# Patient Record
Sex: MALE | Race: WHITE | NOT HISPANIC OR LATINO | Employment: OTHER | URBAN - METROPOLITAN AREA
[De-identification: names, ages, dates, MRNs, and addresses within clinical notes are randomized per-mention and may not be internally consistent; named-entity substitution may affect disease eponyms.]

---

## 2023-05-26 LAB — SARS-COV-2 RESULT: NOT DETECTED

## 2023-10-06 PROBLEM — B35.1 ONYCHOMYCOSIS: Status: ACTIVE | Noted: 2023-09-19

## 2023-10-06 RX ORDER — TAMSULOSIN HYDROCHLORIDE 0.4 MG/1
0.4 CAPSULE ORAL
COMMUNITY
Start: 2023-07-24

## 2023-10-09 ENCOUNTER — OFFICE VISIT (OUTPATIENT)
Dept: CARDIOLOGY | Facility: CLINIC | Age: 78
End: 2023-10-09
Payer: MEDICARE

## 2023-10-09 ENCOUNTER — APPOINTMENT (OUTPATIENT)
Dept: CARDIOLOGY | Facility: CLINIC | Age: 78
End: 2023-10-09
Payer: MEDICARE

## 2023-10-09 VITALS
SYSTOLIC BLOOD PRESSURE: 138 MMHG | HEART RATE: 54 BPM | WEIGHT: 187 LBS | OXYGEN SATURATION: 100 % | DIASTOLIC BLOOD PRESSURE: 80 MMHG

## 2023-10-09 DIAGNOSIS — R60.0 EDEMA LEG: ICD-10-CM

## 2023-10-09 DIAGNOSIS — I50.9 CARDIAC EDEMA (MULTI): ICD-10-CM

## 2023-10-09 DIAGNOSIS — C61 PROSTATE CA (MULTI): ICD-10-CM

## 2023-10-09 DIAGNOSIS — R00.1 SINUS BRADYCARDIA: Primary | ICD-10-CM

## 2023-10-09 DIAGNOSIS — M79.89 SWELLING OF LOWER EXTREMITY: ICD-10-CM

## 2023-10-09 PROCEDURE — 1159F MED LIST DOCD IN RCRD: CPT | Performed by: INTERNAL MEDICINE

## 2023-10-09 PROCEDURE — 1160F RVW MEDS BY RX/DR IN RCRD: CPT | Performed by: INTERNAL MEDICINE

## 2023-10-09 PROCEDURE — 99204 OFFICE O/P NEW MOD 45 MIN: CPT | Performed by: INTERNAL MEDICINE

## 2023-10-09 PROCEDURE — 93000 ELECTROCARDIOGRAM COMPLETE: CPT | Performed by: INTERNAL MEDICINE

## 2023-10-09 RX ORDER — FUROSEMIDE 20 MG/1
20 TABLET ORAL DAILY
Qty: 90 TABLET | Refills: 3 | Status: SHIPPED | OUTPATIENT
Start: 2023-10-09 | End: 2023-11-20 | Stop reason: SDUPTHER

## 2023-10-09 NOTE — PROGRESS NOTES
Referred by Dr. Norwood for Leg Swelling     History Of Present Illness:    Jorje Espinal is a 78 y.o. male  with no cardiac hz presenting with LE edema several weeks.    Patient denies chest pain/SOB/palpitations/dizziness/lightheadedness//claudication     Active-works 40 hours per week at [employer]                  Past Medical History  Prostate cancer    Past Surgical History:  Prostate biopsy      Social History:  No tobacco    Family History:  Non contributory     Allergies:  Patient has no known allergies.    Outpatient Medications:  Current Outpatient Medications   Medication Instructions    tamsulosin (FLOMAX) 0.4 mg, oral, Daily RT        Last Recorded Vitals:  Vitals:    10/09/23 1537   BP: 134/78   BP Location: Left arm   Patient Position: Sitting   Pulse: 54   SpO2: 100%   Weight: 84.8 kg (187 lb)       Physical Exam:   PHYSICAL EXAM:    Constitutional/General:  Alert and oriented x3, well appearing, nontoxic, and in NAD  Neck:  No increased JVP,no carotid bruits.  Respiratory:  Lungs clear to auscultation bilaterally, no wheezes, rales, or rhonchi, not in respiratory distress  Cardiovascular:  Regular rate, regular rhythm, no murmurs, gallops, or rubs, PMI nondisplaced, 2+ distal pulses  Chest:  No chest wall tenderness  GI:  Abdomen soft, nontender, nondistended, + BS, no organomegaly, no palpable masses, no rebound, guarding, or rigidity  Musculoskeletal:  Moves all extremities x4  Extremities:3 plus pitting LE peripheral edema  Integument: Skin warm and dry, no rashes  Neurologic:  No focal deficits  Psychiatric:  Normal affect    Vital Signs, Nursing Notes, Allergies, and Medications reviewed by me.       Last Labs:  CBC -  Lab Results   Component Value Date    WBC 9.8 08/13/2023    HGB 13.0 (L) 08/13/2023    HCT 41.4 08/13/2023    MCV 93 08/13/2023     08/13/2023       CMP -  Lab Results   Component Value Date    CALCIUM 10.8 (H) 08/13/2023    PROT 7.5 08/13/2023    ALBUMIN 4.8 08/13/2023  "   AST 14 08/13/2023    ALT 8 (L) 08/13/2023    ALKPHOS 86 08/13/2023    BILITOT 0.9 08/13/2023       LIPID PANEL -   No results found for: \"CHOL\", \"TRIG\", \"HDL\", \"CHHDL\", \"LDLF\", \"VLDL\", \"NHDL\"    RENAL FUNCTION PANEL -   Lab Results   Component Value Date    GLUCOSE 117 (H) 08/13/2023     08/13/2023    K 4.2 08/13/2023     08/13/2023    CO2 24 08/13/2023    ANIONGAP 14 08/13/2023    BUN 16 08/13/2023    CREATININE 0.82 08/13/2023    GFRMALE 89 08/13/2023    CALCIUM 10.8 (H) 08/13/2023    ALBUMIN 4.8 08/13/2023        Lab Results   Component Value Date    BNP 98 12/27/2020       Last Cardiology Tests:  ECG:  ECG 12 Lead 10/9/2023    SBRAD  Echo:  No results found for this or any previous visit from the past 1095 days.  Ejection Fractions:  No results found for: \"EF\"  Cath:  No results found for this or any previous visit from the past 1095 days.    Stress Test:  No results found for this or any previous visit from the past 1095 days.    Cardiac Imaging:  No results found for this or any previous visit from the past 1095 days.      Lab review: I have personally reviewed the laboratory result(s)     Assessment/Plan   Problem List Items Addressed This Visit             ICD-10-CM    Swelling of lower extremity M79.89     Marked LE edema  No other signs or symptoms of CHF  Start diuretic  Check echo/BNP         Prostate CA (CMS/[facility]) C61     Other Visit Diagnoses         Codes    Sinus bradycardia    -  Primary R00.1    Relevant Orders    ECG 12 Lead (Completed)            Echo  BNP  Start Ginna Norwood, DO  "

## 2023-10-25 ENCOUNTER — HOSPITAL ENCOUNTER (OUTPATIENT)
Dept: CARDIOLOGY | Facility: CLINIC | Age: 78
Discharge: HOME | End: 2023-10-25
Payer: MEDICARE

## 2023-10-25 VITALS
BODY MASS INDEX: 30.05 KG/M2 | WEIGHT: 187 LBS | SYSTOLIC BLOOD PRESSURE: 138 MMHG | DIASTOLIC BLOOD PRESSURE: 80 MMHG | HEIGHT: 66 IN

## 2023-10-25 DIAGNOSIS — M79.89 SWELLING OF LOWER EXTREMITY: ICD-10-CM

## 2023-10-25 DIAGNOSIS — R60.0 EDEMA LEG: ICD-10-CM

## 2023-10-25 LAB — EJECTION FRACTION APICAL 4 CHAMBER: 71.3

## 2023-10-25 PROCEDURE — 93306 TTE W/DOPPLER COMPLETE: CPT | Performed by: INTERNAL MEDICINE

## 2023-10-25 PROCEDURE — 93306 TTE W/DOPPLER COMPLETE: CPT

## 2023-11-14 ENCOUNTER — LAB (OUTPATIENT)
Dept: LAB | Facility: LAB | Age: 78
End: 2023-11-14
Payer: MEDICARE

## 2023-11-14 DIAGNOSIS — M79.89 SWELLING OF LOWER EXTREMITY: ICD-10-CM

## 2023-11-14 DIAGNOSIS — I50.9 CARDIAC EDEMA (MULTI): ICD-10-CM

## 2023-11-14 LAB
ANION GAP SERPL CALC-SCNC: 11 MMOL/L (ref 10–20)
BNP SERPL-MCNC: 241 PG/ML (ref 0–99)
BUN SERPL-MCNC: 19 MG/DL (ref 6–23)
CALCIUM SERPL-MCNC: 10.3 MG/DL (ref 8.6–10.3)
CHLORIDE SERPL-SCNC: 108 MMOL/L (ref 98–107)
CO2 SERPL-SCNC: 26 MMOL/L (ref 21–32)
CREAT SERPL-MCNC: 0.95 MG/DL (ref 0.5–1.3)
GFR SERPL CREATININE-BSD FRML MDRD: 81 ML/MIN/1.73M*2
GLUCOSE SERPL-MCNC: 83 MG/DL (ref 74–99)
POTASSIUM SERPL-SCNC: 4.4 MMOL/L (ref 3.5–5.3)
SODIUM SERPL-SCNC: 141 MMOL/L (ref 136–145)

## 2023-11-14 PROCEDURE — 80048 BASIC METABOLIC PNL TOTAL CA: CPT

## 2023-11-14 PROCEDURE — 36415 COLL VENOUS BLD VENIPUNCTURE: CPT

## 2023-11-14 PROCEDURE — 83880 ASSAY OF NATRIURETIC PEPTIDE: CPT

## 2023-11-20 ENCOUNTER — OFFICE VISIT (OUTPATIENT)
Dept: CARDIOLOGY | Facility: CLINIC | Age: 78
End: 2023-11-20
Payer: MEDICARE

## 2023-11-20 VITALS
HEART RATE: 55 BPM | HEIGHT: 70 IN | OXYGEN SATURATION: 95 % | DIASTOLIC BLOOD PRESSURE: 88 MMHG | BODY MASS INDEX: 26.92 KG/M2 | WEIGHT: 188 LBS | SYSTOLIC BLOOD PRESSURE: 160 MMHG

## 2023-11-20 DIAGNOSIS — M79.89 SWELLING OF LOWER EXTREMITY: ICD-10-CM

## 2023-11-20 DIAGNOSIS — I10 HYPERTENSION, UNSPECIFIED TYPE: Primary | ICD-10-CM

## 2023-11-20 PROCEDURE — 3079F DIAST BP 80-89 MM HG: CPT | Performed by: INTERNAL MEDICINE

## 2023-11-20 PROCEDURE — 1160F RVW MEDS BY RX/DR IN RCRD: CPT | Performed by: INTERNAL MEDICINE

## 2023-11-20 PROCEDURE — 99214 OFFICE O/P EST MOD 30 MIN: CPT | Performed by: INTERNAL MEDICINE

## 2023-11-20 PROCEDURE — 1036F TOBACCO NON-USER: CPT | Performed by: INTERNAL MEDICINE

## 2023-11-20 PROCEDURE — 1159F MED LIST DOCD IN RCRD: CPT | Performed by: INTERNAL MEDICINE

## 2023-11-20 PROCEDURE — 3077F SYST BP >= 140 MM HG: CPT | Performed by: INTERNAL MEDICINE

## 2023-11-20 RX ORDER — FUROSEMIDE 40 MG/1
40 TABLET ORAL DAILY
Qty: 90 TABLET | Refills: 3 | Status: SHIPPED | OUTPATIENT
Start: 2023-11-20 | End: 2024-04-25

## 2023-11-20 NOTE — PROGRESS NOTES
"Chief Complaint:   Edema     History Of Present Illness:    Jorje Espinal is a 78 y.o. male presenting with edema.  Edema is better  Patient denies chest pain/SOB/palpitations/dizziness/lightheadedness/claudication    No regular exerciser       Last Recorded Vitals:  Vitals:    11/20/23 1507 11/20/23 1538   BP: 148/90 160/88   BP Location: Left arm    Patient Position: Sitting    BP Cuff Size: Adult    Pulse: 55    SpO2: 95%    Weight: 85.3 kg (188 lb)    Height: 1.778 m (5' 10\")             Allergies:  Patient has no known allergies.    Outpatient Medications:  Current Outpatient Medications   Medication Instructions    enzalutamide (XTANDI) 160 mg, oral, Daily, Take with or without food at the same time each day. Do not crush, break, or dissolve. Swallow it whole.    furosemide (LASIX) 20 mg, oral, Daily    tamsulosin (FLOMAX) 0.4 mg, oral, Daily RT       Physical Exam:  Constitutional:       Appearance: Not in distress. Frail.   Neck:      Vascular: No JVR. JVD normal.   Pulmonary:      Effort: Pulmonary effort is normal.      Breath sounds: Normal breath sounds. No wheezing. No rhonchi. No rales.   Chest:      Chest wall: Not tender to palpatation.   Cardiovascular:      PMI at left midclavicular line. Normal rate. Regular rhythm. Normal S1. Normal S2.       Murmurs: There is no murmur.      No gallop.  No click. No rub.   Pulses:     Intact distal pulses.   Edema:     Pretibial: bilateral 1+ pitting edema of the pretibial area.     Ankle: bilateral 1+ pitting edema of the ankle.     Feet: bilateral 1+ pitting edema of the feet.  Abdominal:      General: Bowel sounds are normal.      Palpations: Abdomen is soft.      Tenderness: There is no abdominal tenderness.   Musculoskeletal: Normal range of motion.         General: No tenderness. Skin:     General: Skin is warm and dry.   Neurological:      General: No focal deficit present.      Mental Status: Alert and oriented to person, place and time.          Last " "Labs:  CBC -  Lab Results   Component Value Date    WBC 9.8 08/13/2023    HGB 13.0 (L) 08/13/2023    HCT 41.4 08/13/2023    MCV 93 08/13/2023     08/13/2023       CMP -  Lab Results   Component Value Date    CALCIUM 10.3 11/14/2023    PROT 7.5 08/13/2023    ALBUMIN 4.8 08/13/2023    AST 14 08/13/2023    ALT 8 (L) 08/13/2023    ALKPHOS 86 08/13/2023    BILITOT 0.9 08/13/2023       LIPID PANEL -   No results found for: \"CHOL\", \"TRIG\", \"HDL\", \"CHHDL\", \"LDLF\", \"VLDL\", \"NHDL\"    RENAL FUNCTION PANEL -   Lab Results   Component Value Date    GLUCOSE 83 11/14/2023     11/14/2023    K 4.4 11/14/2023     (H) 11/14/2023    CO2 26 11/14/2023    ANIONGAP 11 11/14/2023    BUN 19 11/14/2023    CREATININE 0.95 11/14/2023    GFRMALE 89 08/13/2023    CALCIUM 10.3 11/14/2023    ALBUMIN 4.8 08/13/2023        Lab Results   Component Value Date     (H) 11/14/2023           Lab review: I have personally reviewed the laboratory result(s)       Problem List Items Addressed This Visit       Swelling of lower extremity    Overview     L edema better with Lasix but still not resolved thus increase from 20-40 MG  Note 11/2023 BMP OK  Suspect more peripheral than central however BNP 11/2023 was mildly elevated at 241.NL LVEF per echo.  Increasing Lasix           Hypertension - Primary    Overview     BP elevated in office and also edema thus increase Lasix          Watch salt intake    Increase furosemide(water pill) to 40 mg daily both for swelling and BP      Harshad Norwood, DO  "

## 2024-02-20 ENCOUNTER — OFFICE VISIT (OUTPATIENT)
Dept: CARDIOLOGY | Facility: CLINIC | Age: 79
End: 2024-02-20
Payer: MEDICARE

## 2024-02-20 VITALS
SYSTOLIC BLOOD PRESSURE: 138 MMHG | BODY MASS INDEX: 27.09 KG/M2 | HEART RATE: 74 BPM | DIASTOLIC BLOOD PRESSURE: 76 MMHG | OXYGEN SATURATION: 94 % | WEIGHT: 188.8 LBS

## 2024-02-20 DIAGNOSIS — I10 HYPERTENSION, UNSPECIFIED TYPE: ICD-10-CM

## 2024-02-20 DIAGNOSIS — M79.89 SWELLING OF LOWER EXTREMITY: Primary | ICD-10-CM

## 2024-02-20 PROCEDURE — 99213 OFFICE O/P EST LOW 20 MIN: CPT | Performed by: INTERNAL MEDICINE

## 2024-02-20 PROCEDURE — 3078F DIAST BP <80 MM HG: CPT | Performed by: INTERNAL MEDICINE

## 2024-02-20 PROCEDURE — 1159F MED LIST DOCD IN RCRD: CPT | Performed by: INTERNAL MEDICINE

## 2024-02-20 PROCEDURE — 1160F RVW MEDS BY RX/DR IN RCRD: CPT | Performed by: INTERNAL MEDICINE

## 2024-02-20 PROCEDURE — 1036F TOBACCO NON-USER: CPT | Performed by: INTERNAL MEDICINE

## 2024-02-20 PROCEDURE — 3075F SYST BP GE 130 - 139MM HG: CPT | Performed by: INTERNAL MEDICINE

## 2024-02-20 NOTE — PROGRESS NOTES
Chief Complaint:   Edema     History Of Present Illness:    Jorje Espinal is a 78 y.o. male presenting with CV dz  Edema is better  Patient denies chest pain/SOB/palpitations/dizziness/lightheadedness/claudication  More active-shoveled driveway         Last Recorded Vitals:  Vitals:    02/20/24 1522   BP: 138/76   BP Location: Left arm   Patient Position: Sitting   BP Cuff Size: Adult   Pulse: 74   SpO2: 94%   Weight: 85.6 kg (188 lb 12.8 oz)            Allergies:  Patient has no known allergies.    Outpatient Medications:  Current Outpatient Medications   Medication Instructions    enzalutamide (XTANDI) 160 mg, oral, Daily, Take with or without food at the same time each day. Do not crush, break, or dissolve. Swallow it whole.    furosemide (LASIX) 40 mg, oral, Daily    tamsulosin (FLOMAX) 0.4 mg, oral, Daily RT       Physical Exam:  Constitutional:       Appearance: Not in distress. Frail.   Neck:      Vascular: No JVR. JVD normal.   Pulmonary:      Effort: Pulmonary effort is normal.      Breath sounds: Normal breath sounds. No wheezing. No rhonchi. No rales.   Chest:      Chest wall: Not tender to palpatation.   Cardiovascular:      PMI at left midclavicular line. Normal rate. Regular rhythm. Normal S1. Normal S2.       Murmurs: There is no murmur.      No gallop.  No click. No rub.   Pulses:     Intact distal pulses.   Edema:     Peripheral edema absent.   Abdominal:      General: Bowel sounds are normal.      Palpations: Abdomen is soft.      Tenderness: There is no abdominal tenderness.   Musculoskeletal: Normal range of motion.         General: No tenderness. Skin:     General: Skin is warm and dry.   Neurological:      General: No focal deficit present.      Mental Status: Alert and oriented to person, place and time.          Last Labs:  CBC -  Lab Results   Component Value Date    WBC 9.8 08/13/2023    HGB 13.0 (L) 08/13/2023    HCT 41.4 08/13/2023    MCV 93 08/13/2023     08/13/2023       CMP  "-  Lab Results   Component Value Date    CALCIUM 10.3 11/14/2023    PROT 7.5 08/13/2023    ALBUMIN 4.8 08/13/2023    AST 14 08/13/2023    ALT 8 (L) 08/13/2023    ALKPHOS 86 08/13/2023    BILITOT 0.9 08/13/2023       LIPID PANEL -   No results found for: \"CHOL\", \"TRIG\", \"HDL\", \"CHHDL\", \"LDLF\", \"VLDL\", \"NHDL\"    RENAL FUNCTION PANEL -   Lab Results   Component Value Date    GLUCOSE 83 11/14/2023     11/14/2023    K 4.4 11/14/2023     (H) 11/14/2023    CO2 26 11/14/2023    ANIONGAP 11 11/14/2023    BUN 19 11/14/2023    CREATININE 0.95 11/14/2023    GFRMALE 89 08/13/2023    CALCIUM 10.3 11/14/2023    ALBUMIN 4.8 08/13/2023        Lab Results   Component Value Date     (H) 11/14/2023           Lab review: I have personally reviewed the laboratory result(s)       Problem List Items Addressed This Visit       Swelling of lower extremity - Primary    Overview     Lower  edema better with Lasix but still not resolved thus increased from 20-40 MG  Note 11/2023 BMP OK  Edema now largely resolved  Note:Suspect more peripheral than central however BNP 11/2023 was mildly elevated at 241.NL LVEF per echo.  Follow           Hypertension    Overview     BP better after increasing Lasix          Watch salt intake    Stay active      Harshad Norwood,   "

## 2024-04-23 DIAGNOSIS — M79.89 SWELLING OF LOWER EXTREMITY: ICD-10-CM

## 2024-04-23 NOTE — TELEPHONE ENCOUNTER
Pt is almost out of the 40mg Lasix and wants to know if he should continue taking 40 or go back down to 20. He hasn't been having any leg edema. Does he need lab work? Depending on what dosage pt needs a refill sent to  in [place]. 90 day supply.

## 2024-04-24 RX ORDER — FUROSEMIDE 40 MG/1
40 TABLET ORAL DAILY
Qty: 90 TABLET | Refills: 3 | Status: CANCELLED | OUTPATIENT
Start: 2024-04-24 | End: 2025-04-24

## 2024-04-25 RX ORDER — FUROSEMIDE 20 MG/1
20 TABLET ORAL DAILY
Qty: 90 TABLET | Refills: 3 | Status: SHIPPED | OUTPATIENT
Start: 2024-04-25 | End: 2025-04-25

## 2024-07-30 PROBLEM — M79.675 PAIN IN LEFT TOE(S): Status: ACTIVE | Noted: 2023-12-18

## 2024-07-30 PROBLEM — M79.674 PAIN IN RIGHT TOE(S): Status: ACTIVE | Noted: 2023-12-18

## 2024-08-20 ENCOUNTER — OFFICE VISIT (OUTPATIENT)
Dept: CARDIOLOGY | Facility: CLINIC | Age: 79
End: 2024-08-20
Payer: MEDICARE

## 2024-08-20 ENCOUNTER — APPOINTMENT (OUTPATIENT)
Dept: CARDIOLOGY | Facility: CLINIC | Age: 79
End: 2024-08-20
Payer: MEDICARE

## 2024-08-20 VITALS
WEIGHT: 179 LBS | OXYGEN SATURATION: 97 % | SYSTOLIC BLOOD PRESSURE: 130 MMHG | BODY MASS INDEX: 25.68 KG/M2 | HEART RATE: 61 BPM | DIASTOLIC BLOOD PRESSURE: 80 MMHG

## 2024-08-20 DIAGNOSIS — M79.89 SWELLING OF LOWER EXTREMITY: Primary | ICD-10-CM

## 2024-08-20 DIAGNOSIS — I10 HYPERTENSION, UNSPECIFIED TYPE: ICD-10-CM

## 2024-08-20 PROCEDURE — 99213 OFFICE O/P EST LOW 20 MIN: CPT | Performed by: INTERNAL MEDICINE

## 2024-08-20 PROCEDURE — 1160F RVW MEDS BY RX/DR IN RCRD: CPT | Performed by: INTERNAL MEDICINE

## 2024-08-20 PROCEDURE — 3075F SYST BP GE 130 - 139MM HG: CPT | Performed by: INTERNAL MEDICINE

## 2024-08-20 PROCEDURE — 1159F MED LIST DOCD IN RCRD: CPT | Performed by: INTERNAL MEDICINE

## 2024-08-20 PROCEDURE — 1036F TOBACCO NON-USER: CPT | Performed by: INTERNAL MEDICINE

## 2024-08-20 PROCEDURE — 3079F DIAST BP 80-89 MM HG: CPT | Performed by: INTERNAL MEDICINE

## 2024-08-20 NOTE — PROGRESS NOTES
Chief Complaint:   Edema     History Of Present Illness:    Jorje Espinal is a 78 y.o. male presenting with edema.  Edema is better  Patient denies chest pain/SOB/palpitations/dizziness/lightheadedness/claudication    No regular exercise but active around house /yard       Last Recorded Vitals:  Vitals:    08/20/24 1328 08/20/24 1357   BP: 158/86 130/80   BP Location: Left arm    Patient Position: Sitting    BP Cuff Size: Adult    Pulse: 61    SpO2: 97%    Weight: 81.2 kg (179 lb)             Allergies:  Patient has no known allergies.    Outpatient Medications:  Current Outpatient Medications   Medication Instructions    enzalutamide (XTANDI) 160 mg, oral, Daily, Take with or without food at the same time each day. Do not crush, break, or dissolve. Swallow it whole.    furosemide (LASIX) 20 mg, oral, Daily    tamsulosin (FLOMAX) 0.4 mg, oral, Daily RT       Physical Exam:  Constitutional:       Appearance: Not in distress. Frail.   Neck:      Vascular: No JVR. JVD normal.   Pulmonary:      Effort: Pulmonary effort is normal.      Breath sounds: Normal breath sounds. No wheezing. No rhonchi. No rales.   Chest:      Chest wall: Not tender to palpatation.   Cardiovascular:      PMI at left midclavicular line. Normal rate. Regular rhythm. Normal S1. Normal S2.       Murmurs: There is no murmur.      No gallop.  No click. No rub.   Pulses:     Intact distal pulses.   Edema:     Pretibial: bilateral 1+ pitting edema of the pretibial area.     Ankle: bilateral 1+ pitting edema of the ankle.     Feet: bilateral 1+ pitting edema of the feet.  Abdominal:      General: Bowel sounds are normal.      Palpations: Abdomen is soft.      Tenderness: There is no abdominal tenderness.   Musculoskeletal: Normal range of motion.         General: No tenderness. Skin:     General: Skin is warm and dry.   Neurological:      General: No focal deficit present.      Mental Status: Alert and oriented to person, place and time.          Last  "Labs:  CBC -  Lab Results   Component Value Date    WBC 9.8 08/13/2023    HGB 13.0 (L) 08/13/2023    HCT 41.4 08/13/2023    MCV 93 08/13/2023     08/13/2023       CMP -  Lab Results   Component Value Date    CALCIUM 10.3 11/14/2023    PROT 7.5 08/13/2023    ALBUMIN 4.8 08/13/2023    AST 14 08/13/2023    ALT 8 (L) 08/13/2023    ALKPHOS 86 08/13/2023    BILITOT 0.9 08/13/2023       LIPID PANEL -   No results found for: \"CHOL\", \"TRIG\", \"HDL\", \"CHHDL\", \"LDLF\", \"VLDL\", \"NHDL\"    RENAL FUNCTION PANEL -   Lab Results   Component Value Date    GLUCOSE 83 11/14/2023     11/14/2023    K 4.4 11/14/2023     (H) 11/14/2023    CO2 26 11/14/2023    ANIONGAP 11 11/14/2023    BUN 19 11/14/2023    CREATININE 0.95 11/14/2023    GFRMALE 89 08/13/2023    CALCIUM 10.3 11/14/2023    ALBUMIN 4.8 08/13/2023        Lab Results   Component Value Date     (H) 11/14/2023           Lab review: I have personally reviewed the laboratory result(s)       Problem List Items Addressed This Visit       Swelling of lower extremity - Primary    Overview     Edema better  Have suspected more peripheral than central  On Lasix  11/2024 BMP OK           Hypertension    Overview     BP stable  11/2023 BMP OK                Harshad Norwood, DO  "

## 2024-10-02 DIAGNOSIS — C61 PROSTATE CA (MULTI): ICD-10-CM

## 2024-10-02 DIAGNOSIS — Z12.5 PROSTATE CANCER SCREENING: Primary | ICD-10-CM

## 2024-10-20 NOTE — PROGRESS NOTES
Subjective   Patient ID: Jorje Espinal is a 79 y.o. male who presents for No chief complaint on file..  HPI  PT PRESENTS FOR A F/U ON HIS PROSTATE CANCER.  PT IS ON XTANDI 4 TABS/ DAY AND AN INJECTION  OF SOME TYPE-- LAST SHOT WAS 7-24-24  Are you experiencing:  Burning on urination -- NO  Pain on urination  -- NO  Urinary frequency -- YES -- PT IS ON LASIX  Urinary urgency -- RARE  Urge incontinence -- RARE  Urinary stress incontinence  -- NO  Number of pads used per day -- NONE  Eneuresis -- NO  Nocturia--3 X ON AVG  Hematuria -- NO  Hesitancy -- NO  Post void fullness --  NO    Review of Systems  General-- No C/O fever or chills  Head-- No C/O Dizziness  Eyes-- NO  C/O blurry or double vision  Ears-- No C/O hearing loss  Neck-- Supple  Chest-- No C/O pain or discomfort  Lungs-- No C/O shortness of breath  Abdomen-- No C/O  pain or discomfort, No nausea or vomiting  Back-- No C/O back pain or discomfort  Extremities-- No C/O swelling or pain    Objective   Physical Exam    General-- well developed, well nourished in NAD  Head-- normal cephalic, atraumatic  Eyes-- PERRL, EOM'S FROM,  no  jaundice  Neck-- Supple, without masses  Chest-- Normal bony structure  Abdomen-- soft, non tender, liver spleen not palpable . No supra pubic masses  Back-- no flank masses palpable, no CVA tenderness on palpation or perc;ussion  Lymph nodes-- No inguinal lymphadenopathy noted  Prostate-- 1+, firm, smooth, non tender,without nodules  Testis-- both down, non tender, without masses  Epididymis-- no masses palpable  Scrotum -- no hydrocele noted  Extremities -- Normal muscle mass and tone for the patients age -- ? PETECHIA ON THE LOWER EXTREMITIES AND LOWER ABD  Neurological-- oriented times three    URINALYSIS DIPSTICK-- TRACE OF HEMOGLOBINURIA    PSA  10-21-24-- < 0.10    8-24-24  PSMA PET SCAN:  Impression   1. Heterogeneous mild Ga68 PSMA uptake within the prostate gland  without definite area to correspond to patient's recently  diagnosed  prostate cancer.  2. No evidence of PSMA expressing pelvic peyton or distant metastasis.      Assessment/Plan   A:  H/O PROSTATE CANCER-- PT IS CURRENTLY ON TOTAL HORMONE ABLATION   PT IS NOT SURE WHAT TYPE OF INJECTION HE IS GETTING  NEG PSMA PET SCAN  ON 8-24-24 FOR METASTATIC DISEASE  P:  CONTINUE:  FLOMAX 0.4 MG / DAY  XTANDI 160 MG / DAY  OBTAIN ALL OLD RECORDS FROM [facility] UROLOGY TO SEE WHET TYPE OF INJECTION HE IS GETTING  F/U IN 1-2 WEEKS FOR A 6 MONTH LUPRON INJECTION-- GIVEN THE UNDETECTABLE PSA AND NEG PET SCAN WE MAY BE ABLE TO START HIM ON CYCLING THERAPY DEPENDING ON WHAT THE  PREVIOUS PROSTATE BX GÉNESIS SCORE IS   CBC AND PLATELET COUNT NOW  Max Hines MD 10/20/24 1:29 PM   ADDENDUM  CBC AND PLATELET COUNT   HEMATOCRIT -- 34.8  PLATELET COUNT -- 247,000

## 2024-10-21 ENCOUNTER — OFFICE VISIT (OUTPATIENT)
Dept: UROLOGY | Facility: CLINIC | Age: 79
End: 2024-10-21
Payer: MEDICARE

## 2024-10-21 ENCOUNTER — LAB (OUTPATIENT)
Dept: LAB | Facility: LAB | Age: 79
End: 2024-10-21
Payer: MEDICARE

## 2024-10-21 VITALS
HEIGHT: 70 IN | RESPIRATION RATE: 16 BRPM | DIASTOLIC BLOOD PRESSURE: 86 MMHG | WEIGHT: 179 LBS | SYSTOLIC BLOOD PRESSURE: 153 MMHG | BODY MASS INDEX: 25.62 KG/M2 | HEART RATE: 100 BPM | TEMPERATURE: 98.7 F

## 2024-10-21 DIAGNOSIS — N39.41 URGENCY INCONTINENCE: ICD-10-CM

## 2024-10-21 DIAGNOSIS — C61 PROSTATE CA (MULTI): ICD-10-CM

## 2024-10-21 DIAGNOSIS — R35.0 FREQUENCY OF MICTURITION: ICD-10-CM

## 2024-10-21 DIAGNOSIS — Z12.5 PROSTATE CANCER SCREENING: ICD-10-CM

## 2024-10-21 DIAGNOSIS — Z13.89 SCREENING FOR BLOOD OR PROTEIN IN URINE: ICD-10-CM

## 2024-10-21 DIAGNOSIS — N40.1 BPH WITH URINARY OBSTRUCTION: Primary | ICD-10-CM

## 2024-10-21 DIAGNOSIS — R39.15 URGENCY OF MICTURITION: ICD-10-CM

## 2024-10-21 DIAGNOSIS — N13.8 BPH WITH URINARY OBSTRUCTION: ICD-10-CM

## 2024-10-21 DIAGNOSIS — N40.1 BPH WITH URINARY OBSTRUCTION: ICD-10-CM

## 2024-10-21 DIAGNOSIS — N13.8 BPH WITH URINARY OBSTRUCTION: Primary | ICD-10-CM

## 2024-10-21 DIAGNOSIS — R35.1 NOCTURIA: ICD-10-CM

## 2024-10-21 DIAGNOSIS — C61 PROSTATE CANCER (MULTI): ICD-10-CM

## 2024-10-21 LAB
BASOPHILS # BLD AUTO: 0.02 X10*3/UL (ref 0–0.1)
BASOPHILS NFR BLD AUTO: 0.5 %
EOSINOPHIL # BLD AUTO: 0.11 X10*3/UL (ref 0–0.4)
EOSINOPHIL NFR BLD AUTO: 2.5 %
ERYTHROCYTE [DISTWIDTH] IN BLOOD BY AUTOMATED COUNT: 12.9 % (ref 11.5–14.5)
HCT VFR BLD AUTO: 34.8 % (ref 41–52)
HGB BLD-MCNC: 10.9 G/DL (ref 13.5–17.5)
IMM GRANULOCYTES # BLD AUTO: 0.01 X10*3/UL (ref 0–0.5)
IMM GRANULOCYTES NFR BLD AUTO: 0.2 % (ref 0–0.9)
LYMPHOCYTES # BLD AUTO: 1.1 X10*3/UL (ref 0.8–3)
LYMPHOCYTES NFR BLD AUTO: 25 %
MCH RBC QN AUTO: 29.5 PG (ref 26–34)
MCHC RBC AUTO-ENTMCNC: 31.3 G/DL (ref 32–36)
MCV RBC AUTO: 94 FL (ref 80–100)
MONOCYTES # BLD AUTO: 0.32 X10*3/UL (ref 0.05–0.8)
MONOCYTES NFR BLD AUTO: 7.3 %
NEUTROPHILS # BLD AUTO: 2.84 X10*3/UL (ref 1.6–5.5)
NEUTROPHILS NFR BLD AUTO: 64.5 %
NRBC BLD-RTO: 0 /100 WBCS (ref 0–0)
PLATELET # BLD AUTO: 247 X10*3/UL (ref 150–450)
POC APPEARANCE, URINE: CLEAR
POC BILIRUBIN, URINE: NEGATIVE
POC BLOOD, URINE: ABNORMAL
POC COLOR, URINE: YELLOW
POC GLUCOSE, URINE: NEGATIVE MG/DL
POC KETONES, URINE: NEGATIVE MG/DL
POC LEUKOCYTES, URINE: NEGATIVE
POC NITRITE,URINE: NEGATIVE
POC PH, URINE: 5.5 PH
POC PROTEIN, URINE: NEGATIVE MG/DL
POC SPECIFIC GRAVITY, URINE: 1.02
POC UROBILINOGEN, URINE: 0.2 EU/DL
PSA SERPL-MCNC: <0.1 NG/ML
PSA SERPL-MCNC: <0.1 NG/ML
RBC # BLD AUTO: 3.7 X10*6/UL (ref 4.5–5.9)
WBC # BLD AUTO: 4.4 X10*3/UL (ref 4.4–11.3)

## 2024-10-21 PROCEDURE — 1036F TOBACCO NON-USER: CPT | Performed by: UROLOGY

## 2024-10-21 PROCEDURE — 3077F SYST BP >= 140 MM HG: CPT | Performed by: UROLOGY

## 2024-10-21 PROCEDURE — 3079F DIAST BP 80-89 MM HG: CPT | Performed by: UROLOGY

## 2024-10-21 PROCEDURE — 36415 COLL VENOUS BLD VENIPUNCTURE: CPT

## 2024-10-21 PROCEDURE — 81003 URINALYSIS AUTO W/O SCOPE: CPT | Mod: QW | Performed by: UROLOGY

## 2024-10-21 PROCEDURE — 1160F RVW MEDS BY RX/DR IN RCRD: CPT | Performed by: UROLOGY

## 2024-10-21 PROCEDURE — 84153 ASSAY OF PSA TOTAL: CPT

## 2024-10-21 PROCEDURE — 99214 OFFICE O/P EST MOD 30 MIN: CPT | Performed by: UROLOGY

## 2024-10-21 PROCEDURE — G0103 PSA SCREENING: HCPCS

## 2024-10-21 PROCEDURE — 1159F MED LIST DOCD IN RCRD: CPT | Performed by: UROLOGY

## 2024-10-21 PROCEDURE — 85025 COMPLETE CBC W/AUTO DIFF WBC: CPT

## 2024-10-21 PROCEDURE — 1126F AMNT PAIN NOTED NONE PRSNT: CPT | Performed by: UROLOGY

## 2024-10-21 RX ORDER — TAMSULOSIN HYDROCHLORIDE 0.4 MG/1
0.4 CAPSULE ORAL DAILY
Qty: 90 CAPSULE | Refills: 3 | Status: SHIPPED | OUTPATIENT
Start: 2024-10-21 | End: 2025-10-21

## 2024-10-21 SDOH — ECONOMIC STABILITY: FOOD INSECURITY: WITHIN THE PAST 12 MONTHS, YOU WORRIED THAT YOUR FOOD WOULD RUN OUT BEFORE YOU GOT MONEY TO BUY MORE.: NEVER TRUE

## 2024-10-21 SDOH — ECONOMIC STABILITY: FOOD INSECURITY: WITHIN THE PAST 12 MONTHS, THE FOOD YOU BOUGHT JUST DIDN'T LAST AND YOU DIDN'T HAVE MONEY TO GET MORE.: NEVER TRUE

## 2024-10-21 ASSESSMENT — COLUMBIA-SUICIDE SEVERITY RATING SCALE - C-SSRS
2. HAVE YOU ACTUALLY HAD ANY THOUGHTS OF KILLING YOURSELF?: NO
6. HAVE YOU EVER DONE ANYTHING, STARTED TO DO ANYTHING, OR PREPARED TO DO ANYTHING TO END YOUR LIFE?: NO
1. IN THE PAST MONTH, HAVE YOU WISHED YOU WERE DEAD OR WISHED YOU COULD GO TO SLEEP AND NOT WAKE UP?: NO

## 2024-10-21 ASSESSMENT — PATIENT HEALTH QUESTIONNAIRE - PHQ9
SUM OF ALL RESPONSES TO PHQ9 QUESTIONS 1 AND 2: 0
1. LITTLE INTEREST OR PLEASURE IN DOING THINGS: NOT AT ALL
2. FEELING DOWN, DEPRESSED OR HOPELESS: NOT AT ALL

## 2024-10-21 ASSESSMENT — ENCOUNTER SYMPTOMS
LOSS OF SENSATION IN FEET: 1
DEPRESSION: 0
OCCASIONAL FEELINGS OF UNSTEADINESS: 1

## 2024-10-21 ASSESSMENT — LIFESTYLE VARIABLES
SKIP TO QUESTIONS 9-10: 1
AUDIT-C TOTAL SCORE: 0
HOW OFTEN DO YOU HAVE A DRINK CONTAINING ALCOHOL: NEVER
HOW MANY STANDARD DRINKS CONTAINING ALCOHOL DO YOU HAVE ON A TYPICAL DAY: PATIENT DOES NOT DRINK
HOW OFTEN DO YOU HAVE SIX OR MORE DRINKS ON ONE OCCASION: NEVER

## 2024-10-21 ASSESSMENT — PAIN SCALES - GENERAL: PAINLEVEL_OUTOF10: 0-NO PAIN

## 2024-10-21 NOTE — LETTER
October 23, 2024     Jasvir Levy MD  [address]  [facility] [place], [address]  [facility] [address]    Patient: Jorje Espinal   YOB: 1945   Date of Visit: 10/21/2024       Dear Dr. Jasvir Levy MD:    Thank you for referring Jorje Espinal to me for evaluation. Below are my notes for this consultation.  If you have questions, please do not hesitate to call me. I look forward to following your patient along with you.       Sincerely,     Max Hines MD      CC: No Recipients  ______________________________________________________________________________________    Subjective  Patient ID: Jorje Espinal is a 79 y.o. male who presents for No chief complaint on file..  HPI  PT PRESENTS FOR A F/U ON HIS PROSTATE CANCER.  PT IS ON XTANDI 4 TABS/ DAY AND AN INJECTION  OF SOME TYPE-- LAST SHOT WAS 7-24-24  Are you experiencing:  Burning on urination -- NO  Pain on urination  -- NO  Urinary frequency -- YES -- PT IS ON LASIX  Urinary urgency -- RARE  Urge incontinence -- RARE  Urinary stress incontinence  -- NO  Number of pads used per day -- NONE  Eneuresis -- NO  Nocturia--3 X ON AVG  Hematuria -- NO  Hesitancy -- NO  Post void fullness --  NO    Review of Systems  General-- No C/O fever or chills  Head-- No C/O Dizziness  Eyes-- NO  C/O blurry or double vision  Ears-- No C/O hearing loss  Neck-- Supple  Chest-- No C/O pain or discomfort  Lungs-- No C/O shortness of breath  Abdomen-- No C/O  pain or discomfort, No nausea or vomiting  Back-- No C/O back pain or discomfort  Extremities-- No C/O swelling or pain    Objective   Physical Exam    General-- well developed, well nourished in NAD  Head-- normal cephalic, atraumatic  Eyes-- PERRL, EOM'S FROM,  no  jaundice  Neck-- Supple, without masses  Chest-- Normal bony structure  Abdomen-- soft, non tender, liver spleen not palpable . No supra pubic masses  Back-- no flank masses palpable, no CVA tenderness on palpation or perc;ussion  Lymph nodes-- No  inguinal lymphadenopathy noted  Prostate-- 1+, firm, smooth, non tender,without nodules  Testis-- both down, non tender, without masses  Epididymis-- no masses palpable  Scrotum -- no hydrocele noted  Extremities -- Normal muscle mass and tone for the patients age -- ? PETECHIA ON THE LOWER EXTREMITIES AND LOWER ABD  Neurological-- oriented times three    URINALYSIS DIPSTICK-- TRACE OF HEMOGLOBINURIA    PSA  10-21-24-- < 0.10    8-24-24  PSMA PET SCAN:  Impression   1. Heterogeneous mild Ga68 PSMA uptake within the prostate gland  without definite area to correspond to patient's recently diagnosed  prostate cancer.  2. No evidence of PSMA expressing pelvic peyton or distant metastasis.      Assessment/Plan   A:  H/O PROSTATE CANCER-- PT IS CURRENTLY ON TOTAL HORMONE ABLATION   PT IS NOT SURE WHAT TYPE OF INJECTION HE IS GETTING  NEG PSMA PET SCAN  ON 8-24-24 FOR METASTATIC DISEASE  P:  CONTINUE:  FLOMAX 0.4 MG / DAY  XTANDI 160 MG / DAY  OBTAIN ALL OLD RECORDS FROM [facility] UROLOGY TO SEE WHET TYPE OF INJECTION HE IS GETTING  F/U IN 1-2 WEEKS FOR A 6 MONTH LUPRON INJECTION-- GIVEN THE UNDETECTABLE PSA AND NEG PET SCAN WE MAY BE ABLE TO START HIM ON CYCLING THERAPY DEPENDING ON WHAT THE  PREVIOUS PROSTATE BX GÉNESIS SCORE IS   CBC AND PLATELET COUNT NOW  Max Hines MD 10/20/24 1:29 PM   ADDENDUM  CBC AND PLATELET COUNT   HEMATOCRIT -- 34.8  PLATELET COUNT -- 247,000

## 2024-11-03 NOTE — PROGRESS NOTES
Subjective   Patient ID: Jorje Espinal is a 79 y.o. male presents to review all that has been done in the past with regards to his prostate cancer and plan for further therapy   PE:  3+ BILATERAL PEDAL EDEMA   PSA:  10-21-24-- < 0.10  4/15/2024--< 0.01  1/11/2024-- < 0.02  5/25/2023-- < 0.01  6/28/2023-- < 0.01  3/21/2023-- < 0.01  12/22/2022--< 0.01    8-24--23  PSMA PET SCAN:  Impression   1. Heterogeneous mild Ga68 PSMA uptake within the prostate gland  without definite area to correspond to patient's recently diagnosed  prostate cancer.  2. No evidence of PSMA expressing pelvic peyton or distant metastasis.     8/13/2023  CAT scan of the abdomen and pelvis:  Impression   1. Small sliver of fluid around left kidney and haziness left  perinephric space; consider pyelonephritis.  2. Benign simple left parapelvic cyst.  3. Marked constipation and possible fecal impaction.     A:  Patient status post prostate ultrasound and biopsy of the prostate 8/28/2015--Dr. DODD--BPH only  Prostate ultrasound and biopsy 11/10/2020--4/12 biopsies came back positive for adenocarcinoma of the prostate.  3/4 biopsies revealed Ramya score of 8 (4+4), 1/4 biopsies [place] score 9 ( 4 + 5 )  CAT scan of the abdomen and pelvis as well as a bone scan performed on 11/21/2020 revealed extensive metastatic disease  Patient started on total androgen ablation NOV 2020  PET scan performed on 8/23/2023 revealed complete resolution in his metastatic disease  PSAs have remained undetectable for over 2 years  Pathophysiology of the above discussed in detail with the patient  Options of therapy were also discussed in detail.  These included  #1 continue with total androgen ablation therapy  #2 begin cycling therapy and discontinue androgen ablation therapy for now given his age and undetectable PSAs for over 2 years  Pros, cons of the above options of therapy discussed in detail with the patient  All questions answered    P:  WILL START THE PT  ON CYCLING THERAPY-- HE WILL STOP THE ZOLADEX SHOTS AND THE XTANDI FOR NOW  F/ U IN 4 MONTHS WITH A PSA   PT TO SEE DR STANTON RE: HIS ANKLE SWELLING     Max Hines MD 11/03/24 12:24 PM

## 2024-11-04 ENCOUNTER — OFFICE VISIT (OUTPATIENT)
Dept: UROLOGY | Facility: CLINIC | Age: 79
End: 2024-11-04
Payer: MEDICARE

## 2024-11-04 VITALS
DIASTOLIC BLOOD PRESSURE: 86 MMHG | SYSTOLIC BLOOD PRESSURE: 146 MMHG | HEIGHT: 70 IN | WEIGHT: 179 LBS | TEMPERATURE: 97.9 F | HEART RATE: 52 BPM | BODY MASS INDEX: 25.62 KG/M2 | RESPIRATION RATE: 16 BRPM

## 2024-11-04 DIAGNOSIS — C61 PROSTATE CA (MULTI): ICD-10-CM

## 2024-11-04 DIAGNOSIS — N40.1 BENIGN PROSTATIC HYPERPLASIA WITH LOWER URINARY TRACT SYMPTOMS, SYMPTOM DETAILS UNSPECIFIED: ICD-10-CM

## 2024-11-04 LAB
POC APPEARANCE, URINE: CLEAR
POC BILIRUBIN, URINE: NEGATIVE
POC BLOOD, URINE: ABNORMAL
POC COLOR, URINE: YELLOW
POC GLUCOSE, URINE: NEGATIVE MG/DL
POC KETONES, URINE: NEGATIVE MG/DL
POC LEUKOCYTES, URINE: NEGATIVE
POC NITRITE,URINE: NEGATIVE
POC PH, URINE: 5.5 PH
POC PROTEIN, URINE: ABNORMAL MG/DL
POC SPECIFIC GRAVITY, URINE: 1.02
POC UROBILINOGEN, URINE: 0.2 EU/DL

## 2024-11-04 PROCEDURE — 99213 OFFICE O/P EST LOW 20 MIN: CPT | Performed by: UROLOGY

## 2024-11-04 PROCEDURE — 1036F TOBACCO NON-USER: CPT | Performed by: UROLOGY

## 2024-11-04 PROCEDURE — 3077F SYST BP >= 140 MM HG: CPT | Performed by: UROLOGY

## 2024-11-04 PROCEDURE — 1126F AMNT PAIN NOTED NONE PRSNT: CPT | Performed by: UROLOGY

## 2024-11-04 PROCEDURE — 3079F DIAST BP 80-89 MM HG: CPT | Performed by: UROLOGY

## 2024-11-04 PROCEDURE — 1159F MED LIST DOCD IN RCRD: CPT | Performed by: UROLOGY

## 2024-11-04 PROCEDURE — 1160F RVW MEDS BY RX/DR IN RCRD: CPT | Performed by: UROLOGY

## 2024-11-04 PROCEDURE — 81003 URINALYSIS AUTO W/O SCOPE: CPT | Mod: QW | Performed by: UROLOGY

## 2024-11-04 ASSESSMENT — ENCOUNTER SYMPTOMS
LOSS OF SENSATION IN FEET: 1
DEPRESSION: 0
OCCASIONAL FEELINGS OF UNSTEADINESS: 1

## 2024-11-04 ASSESSMENT — PAIN SCALES - GENERAL: PAINLEVEL_OUTOF10: 0-NO PAIN

## 2024-11-04 NOTE — LETTER
November 5, 2024     Jasvir Levy MD  [address]  [facility], [place] [address]  [facility] [id number]    Patient: Jorje Espinal   YOB: 1945   Date of Visit: 11/4/2024       Dear Dr. Jasvir Levy MD:    Thank you for referring Jorje Espinal to me for evaluation. Below are my notes for this consultation.  If you have questions, please do not hesitate to call me. I look forward to following your patient along with you.       Sincerely,     Max Hines MD      CC: No Recipients  ______________________________________________________________________________________    Subjective  Patient ID: Jorje Espinal is a 79 y.o. male presents to review all that has been done in the past with regards to his prostate cancer and plan for further therapy   PE:  3+ BILATERAL PEDAL EDEMA   PSA:  10-21-24-- < 0.10  4/15/2024--< 0.01  1/11/2024-- < 0.02  5/25/2023-- < 0.01  6/28/2023-- < 0.01  3/21/2023-- < 0.01  12/22/2022--< 0.01    8-24--23  PSMA PET SCAN:  Impression   1. Heterogeneous mild Ga68 PSMA uptake within the prostate gland  without definite area to correspond to patient's recently diagnosed  prostate cancer.  2. No evidence of PSMA expressing pelvic peyton or distant metastasis.     8/13/2023  CAT scan of the abdomen and pelvis:  Impression   1. Small sliver of fluid around left kidney and haziness left  perinephric space; consider pyelonephritis.  2. Benign simple left parapelvic cyst.  3. Marked constipation and possible fecal impaction.     A:  Patient status post prostate ultrasound and biopsy of the prostate 8/28/2015--Dr. DODD--BPH only  Prostate ultrasound and biopsy 11/10/2020--4/12 biopsies came back positive for adenocarcinoma of the prostate.  3/4 biopsies revealed Emerson score of 8 (4+4), 1/4 biopsies Emerson score 9 ( 4 + 5 )  CAT scan of the abdomen and pelvis as well as a bone scan performed on 11/21/2020 revealed extensive metastatic disease  Patient started on total androgen ablation NOV  2020  PET scan performed on 8/23/2023 revealed complete resolution in his metastatic disease  PSAs have remained undetectable for over 2 years  Pathophysiology of the above discussed in detail with the patient  Options of therapy were also discussed in detail.  These included  #1 continue with total androgen ablation therapy  #2 begin cycling therapy and discontinue androgen ablation therapy for now given his age and undetectable PSAs for over 2 years  Pros, cons of the above options of therapy discussed in detail with the patient  All questions answered    P:  WILL START THE PT ON CYCLING THERAPY-- HE WILL STOP THE ZOLADEX SHOTS AND THE XTANDI FOR NOW  F/ U IN 4 MONTHS WITH A PSA   PT TO SEE DR STANTON RE: HIS ANKLE SWELLING     Max Hines MD 11/03/24 12:24 PM

## 2024-12-16 DIAGNOSIS — M79.89 SWELLING OF LOWER EXTREMITY: ICD-10-CM

## 2024-12-16 RX ORDER — FUROSEMIDE 20 MG/1
20 TABLET ORAL DAILY
Qty: 90 TABLET | Refills: 3 | Status: CANCELLED | OUTPATIENT
Start: 2024-12-16 | End: 2025-12-16

## 2025-03-10 ENCOUNTER — APPOINTMENT (OUTPATIENT)
Dept: UROLOGY | Facility: CLINIC | Age: 80
End: 2025-03-10
Payer: MEDICARE

## 2025-04-24 DIAGNOSIS — M79.89 SWELLING OF LOWER EXTREMITY: ICD-10-CM

## 2025-04-24 RX ORDER — FUROSEMIDE 20 MG/1
20 TABLET ORAL DAILY
Qty: 90 TABLET | Refills: 0 | Status: SHIPPED | OUTPATIENT
Start: 2025-04-24 | End: 2026-04-24

## 2025-07-12 DIAGNOSIS — M79.89 SWELLING OF LOWER EXTREMITY: ICD-10-CM

## 2025-07-14 RX ORDER — FUROSEMIDE 20 MG/1
20 TABLET ORAL DAILY
Qty: 90 TABLET | Refills: 0 | Status: SHIPPED | OUTPATIENT
Start: 2025-07-14

## 2025-08-04 ENCOUNTER — APPOINTMENT (OUTPATIENT)
Dept: RADIOLOGY | Facility: CLINIC | Age: 80
End: 2025-08-04
Payer: MEDICARE

## 2025-08-11 ENCOUNTER — APPOINTMENT (OUTPATIENT)
Dept: RADIOLOGY | Facility: CLINIC | Age: 80
End: 2025-08-11
Payer: MEDICARE

## 2025-08-18 ENCOUNTER — HOSPITAL ENCOUNTER (OUTPATIENT)
Dept: RADIOLOGY | Facility: CLINIC | Age: 80
Discharge: HOME | End: 2025-08-18
Payer: MEDICARE

## 2025-08-18 DIAGNOSIS — C61 MALIGNANT NEOPLASM OF PROSTATE (MULTI): ICD-10-CM

## 2025-08-18 PROCEDURE — 78815 PET IMAGE W/CT SKULL-THIGH: CPT | Mod: PS

## 2025-08-18 PROCEDURE — A9800 HC RX 343 DIAGNOSTIC RADIOPHARMACEUTICALS: HCPCS | Performed by: UROLOGY

## 2025-08-18 PROCEDURE — 78815 PET IMAGE W/CT SKULL-THIGH: CPT | Mod: PET TUMOR SUBSQ TX STRATEGY

## 2025-08-18 PROCEDURE — 3430000001 HC RX 343 DIAGNOSTIC RADIOPHARMACEUTICALS: Performed by: UROLOGY

## 2025-08-18 RX ADMIN — KIT FOR THE PREPARATION OF GALLIUM GA 68 GOZETOTIDE 5.8 MILLICURIE: 25 INJECTION, POWDER, LYOPHILIZED, FOR SOLUTION INTRAVENOUS at 10:53

## 2025-08-20 ENCOUNTER — APPOINTMENT (OUTPATIENT)
Dept: CARDIOLOGY | Facility: CLINIC | Age: 80
End: 2025-08-20
Payer: MEDICARE